# Patient Record
Sex: MALE | Race: BLACK OR AFRICAN AMERICAN | NOT HISPANIC OR LATINO | Employment: STUDENT | URBAN - METROPOLITAN AREA
[De-identification: names, ages, dates, MRNs, and addresses within clinical notes are randomized per-mention and may not be internally consistent; named-entity substitution may affect disease eponyms.]

---
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
## 2019-05-25 ENCOUNTER — HOSPITAL ENCOUNTER (EMERGENCY)
Facility: HOSPITAL | Age: 19
Discharge: HOME | End: 2019-05-25
Attending: EMERGENCY MEDICINE
Payer: MEDICAID

## 2019-05-25 VITALS
WEIGHT: 210 LBS | SYSTOLIC BLOOD PRESSURE: 127 MMHG | TEMPERATURE: 98 F | RESPIRATION RATE: 18 BRPM | HEIGHT: 68 IN | HEART RATE: 66 BPM | BODY MASS INDEX: 31.83 KG/M2 | DIASTOLIC BLOOD PRESSURE: 57 MMHG | OXYGEN SATURATION: 99 %

## 2019-05-25 DIAGNOSIS — Z20.2 STD EXPOSURE: Primary | ICD-10-CM

## 2019-05-25 PROCEDURE — 99284 EMERGENCY DEPT VISIT MOD MDM: CPT | Mod: 25

## 2019-05-25 PROCEDURE — 63600175 PHARM REV CODE 636 W HCPCS: Performed by: PHYSICIAN ASSISTANT

## 2019-05-25 PROCEDURE — 96372 THER/PROPH/DIAG INJ SC/IM: CPT

## 2019-05-25 PROCEDURE — 87491 CHLMYD TRACH DNA AMP PROBE: CPT

## 2019-05-25 PROCEDURE — 25000003 PHARM REV CODE 250: Performed by: PHYSICIAN ASSISTANT

## 2019-05-25 RX ORDER — METRONIDAZOLE 500 MG/1
2000 TABLET ORAL ONCE
Qty: 4 TABLET | Refills: 0 | Status: SHIPPED | OUTPATIENT
Start: 2019-05-25 | End: 2019-05-25

## 2019-05-25 RX ORDER — AZITHROMYCIN 250 MG/1
1000 TABLET, FILM COATED ORAL
Status: COMPLETED | OUTPATIENT
Start: 2019-05-25 | End: 2019-05-25

## 2019-05-25 RX ORDER — ONDANSETRON 4 MG/1
8 TABLET, ORALLY DISINTEGRATING ORAL ONCE
Qty: 2 TABLET | Refills: 0 | Status: SHIPPED | OUTPATIENT
Start: 2019-05-25 | End: 2019-05-25

## 2019-05-25 RX ORDER — CEFTRIAXONE 250 MG/1
250 INJECTION, POWDER, FOR SOLUTION INTRAMUSCULAR; INTRAVENOUS
Status: COMPLETED | OUTPATIENT
Start: 2019-05-25 | End: 2019-05-25

## 2019-05-25 RX ORDER — ONDANSETRON 4 MG/1
4 TABLET, ORALLY DISINTEGRATING ORAL
Status: COMPLETED | OUTPATIENT
Start: 2019-05-25 | End: 2019-05-25

## 2019-05-25 RX ADMIN — CEFTRIAXONE SODIUM 250 MG: 250 INJECTION, POWDER, FOR SOLUTION INTRAMUSCULAR; INTRAVENOUS at 06:05

## 2019-05-25 RX ADMIN — ONDANSETRON 4 MG: 4 TABLET, ORALLY DISINTEGRATING ORAL at 06:05

## 2019-05-25 RX ADMIN — AZITHROMYCIN MONOHYDRATE 1000 MG: 250 TABLET ORAL at 06:05

## 2019-05-25 NOTE — DISCHARGE INSTRUCTIONS
You were treated for possible Gonorrhea and Chlamydia today in ER.   You were given one time dose of Flagyl for trichomonas to take tomorrow with zofran to prevent nausea.     Follow up with STI clinic for full evaluation. You should abstain from intercourse for at least 1 week to allow infection to clear.     Return to ER for worsening symptoms or as needed.

## 2019-05-26 NOTE — ED PROVIDER NOTES
Encounter Date: 5/25/2019       History     Chief Complaint   Patient presents with    Exposure to STD     Pt reports he was possibly exposed to STD. Pt denies symptoms at this time.      CC: STD Exposure     HPI:   18-year-old male with no pertinent past medical history presenting with sexual partner for exposure to Trichomonas.  Patient denies any symptoms.  His partner was treated last week.  They have not had sexual intercourse since she was treated.  He denies fever, chills, nausea vomiting, abdominal pain, penile discharge, pain or swelling, testicular pain or scrotal swelling, dysuria, hematuria, urinary urgency or frequency. No attempted treatment.        Review of patient's allergies indicates:  No Known Allergies  History reviewed. No pertinent past medical history.  History reviewed. No pertinent surgical history.  History reviewed. No pertinent family history.  Social History     Tobacco Use    Smoking status: Never Smoker   Substance Use Topics    Alcohol use: No    Drug use: No     Review of Systems   Constitutional: Negative for chills and fever.   HENT: Negative for trouble swallowing.    Eyes: Negative for redness.   Respiratory: Negative for stridor.    Gastrointestinal: Negative for abdominal pain, nausea and vomiting.   Genitourinary: Negative for discharge, dysuria, frequency, hematuria, penile pain, penile swelling, scrotal swelling, testicular pain and urgency.   Musculoskeletal: Negative for back pain, gait problem and neck pain.   Skin: Negative for rash.   Neurological: Negative for speech difficulty.   Psychiatric/Behavioral: Negative for confusion.       Physical Exam     Initial Vitals [05/25/19 1831]   BP Pulse Resp Temp SpO2   (!) 127/57 66 18 98 °F (36.7 °C) 99 %      MAP       --         Physical Exam    Nursing note and vitals reviewed.  Constitutional: He appears well-developed and well-nourished. No distress.   HENT:   Head: Normocephalic.   Right Ear: External ear normal.    Left Ear: External ear normal.   Nose: Nose normal.   Mouth/Throat: Oropharynx is clear and moist.   Eyes: Conjunctivae are normal.   Cardiovascular: Normal rate and regular rhythm. Exam reveals no gallop and no friction rub.    No murmur heard.  Pulmonary/Chest: Breath sounds normal. No respiratory distress. He has no wheezes. He has no rhonchi. He has no rales.   Abdominal: Soft. Bowel sounds are normal. He exhibits no distension. There is no tenderness. There is no rebound and no guarding.   Musculoskeletal: Normal range of motion.   Neurological: He is alert.   Skin: Skin is warm and dry. No rash noted.   Psychiatric: He has a normal mood and affect.         ED Course   Procedures  Labs Reviewed   C. TRACHOMATIS/N. GONORRHOEAE BY AMP DNA          Imaging Results    None          Medical Decision Making:   Initial Assessment:   18-year-old male presenting for STD exposure to Trichomonas.  He denies any symptoms at this time.  Treated empirically for gonorrhea chlamydia with Rocephin and azithromycin in the ED.  Zofran given to prevent nausea.  Will discharge with 2 g of Flagyl and Zofran for Trichomonas treatment to take tomorrow to avoid nausea and vomiting due to large antibiotic dose.  Lab follow up with his primary care or STI clinic for full evaluation and management.  Return to the ER for worsening symptoms or as needed.                      Clinical Impression:       ICD-10-CM ICD-9-CM   1. STD exposure Z20.2 V01.6                                Nat Benson PA-C  05/25/19 1919

## 2019-05-27 LAB
C TRACH DNA SPEC QL NAA+PROBE: NOT DETECTED
N GONORRHOEA DNA SPEC QL NAA+PROBE: NOT DETECTED

## 2024-01-05 ENCOUNTER — HOSPITAL ENCOUNTER (EMERGENCY)
Facility: OTHER | Age: 24
Discharge: HOME | End: 2024-01-05
Attending: EMERGENCY MEDICINE
Payer: MEDICAID

## 2024-01-05 VITALS
HEART RATE: 73 BPM | WEIGHT: 192 LBS | DIASTOLIC BLOOD PRESSURE: 80 MMHG | SYSTOLIC BLOOD PRESSURE: 126 MMHG | OXYGEN SATURATION: 100 % | TEMPERATURE: 99 F | RESPIRATION RATE: 18 BRPM | BODY MASS INDEX: 27.49 KG/M2 | HEIGHT: 70 IN

## 2024-01-05 DIAGNOSIS — B35.3 TINEA PEDIS OF BOTH FEET: ICD-10-CM

## 2024-01-05 DIAGNOSIS — B35.6 TINEA CRURIS: ICD-10-CM

## 2024-01-05 DIAGNOSIS — Z20.2 POSSIBLE EXPOSURE TO STD: Primary | ICD-10-CM

## 2024-01-05 LAB
BILIRUB UR QL STRIP: NEGATIVE
CLARITY UR: CLEAR
COLOR UR: YELLOW
GLUCOSE UR QL STRIP: NEGATIVE
HGB UR QL STRIP: NEGATIVE
KETONES UR QL STRIP: NEGATIVE
LEUKOCYTE ESTERASE UR QL STRIP: NEGATIVE
NITRITE UR QL STRIP: NEGATIVE
PH UR STRIP: 6 [PH] (ref 5–8)
PROT UR QL STRIP: NEGATIVE
SP GR UR STRIP: 1.02 (ref 1–1.03)
URN SPEC COLLECT METH UR: NORMAL
UROBILINOGEN UR STRIP-ACNC: NEGATIVE EU/DL

## 2024-01-05 PROCEDURE — 81003 URINALYSIS AUTO W/O SCOPE: CPT | Performed by: EMERGENCY MEDICINE

## 2024-01-05 PROCEDURE — 99284 EMERGENCY DEPT VISIT MOD MDM: CPT

## 2024-01-05 PROCEDURE — 87491 CHLMYD TRACH DNA AMP PROBE: CPT | Performed by: EMERGENCY MEDICINE

## 2024-01-05 PROCEDURE — 96372 THER/PROPH/DIAG INJ SC/IM: CPT

## 2024-01-05 PROCEDURE — 63600175 PHARM REV CODE 636 W HCPCS

## 2024-01-05 RX ORDER — DOXYCYCLINE 100 MG/1
100 CAPSULE ORAL 2 TIMES DAILY
Qty: 14 CAPSULE | Refills: 0 | Status: SHIPPED | OUTPATIENT
Start: 2024-01-05 | End: 2024-01-12

## 2024-01-05 RX ORDER — CEFTRIAXONE 500 MG/1
500 INJECTION, POWDER, FOR SOLUTION INTRAMUSCULAR; INTRAVENOUS
Status: COMPLETED | OUTPATIENT
Start: 2024-01-05 | End: 2024-01-05

## 2024-01-05 RX ORDER — KETOCONAZOLE 20 MG/G
CREAM TOPICAL 2 TIMES DAILY
Qty: 30 G | Refills: 0 | Status: SHIPPED | OUTPATIENT
Start: 2024-01-05 | End: 2024-01-26

## 2024-01-05 RX ADMIN — CEFTRIAXONE SODIUM 500 MG: 500 INJECTION, POWDER, FOR SOLUTION INTRAMUSCULAR; INTRAVENOUS at 01:01

## 2024-01-05 NOTE — ED TRIAGE NOTES
Pt c/o itchy rash to groin and buttocks; states he thinks athlete foot transferred to groin. Denies urinary s/s. Endorses unprotected sex.

## 2024-01-05 NOTE — DISCHARGE INSTRUCTIONS
Please apply ketoconazole cream to your feet, groin and buttocks for fungal infection. Please keep the area clean and dry. Follow up with dermatology and podiatry. Referrals have been placed. Please start taking doxycycline if your chlamydia results come back positive. Refrain from sexual intercourse until your symptoms completely resolve. Notify sexual partners if your results are positive so they can get tested.

## 2024-01-06 NOTE — ED PROVIDER NOTES
"Encounter Date: 1/5/2024       History     Chief Complaint   Patient presents with    Exposure to STD     Pt believes he transferred athletes foot from his feet to his groin and buttocks. Denies penile discharge or dysuria. C/o "rawness" and itching to area. Admits to + unprotected sex with 1 partner. Thinks may now have a bacterial infection.     Marcelino Canchola is a 23 y.o. male with history of tinea pedis presenting to the emergency department for evaluation of rash to his feet, groin, and buttocks. He reports the rash has been present for months. He states that he was diagnosed with athlete's foot when he was in half-way.  He states that he never received treatment for this.  He states that he has been touching his foot in an occasionally puts his hands in his pants.  He thinks that he may have transfer the athlete's foot from his feet to his groin.  He has only been cleaning the area with soap and water and applying Vaseline to the area, which has not provided symptom relief.  He denies fever, chills, drainage.  He also reports mild dysuria.  States that he has been having unprotected sexual intercourse with a female partner.  He was concerned for STD and would like to be tested.  He denies abdominal pain, nausea, vomiting, diarrhea, constipation, hematuria, urinary frequency, urinary urgency, penile discharge, penile pain, penile swelling, scrotal swelling, or testicular pain.      The history is provided by the patient.     Review of patient's allergies indicates:  No Known Allergies  No past medical history on file.  No past surgical history on file.  No family history on file.  Social History     Tobacco Use    Smoking status: Never   Substance Use Topics    Alcohol use: No    Drug use: No     Review of Systems   Constitutional:  Negative for chills and fever.   HENT:  Negative for congestion, rhinorrhea and sore throat.    Respiratory:  Negative for cough and shortness of breath.    Cardiovascular:  Negative " for chest pain.   Gastrointestinal:  Negative for abdominal pain, diarrhea, nausea and vomiting.   Genitourinary:  Negative for dysuria, frequency and urgency.   Musculoskeletal:  Negative for back pain.   Skin:  Positive for rash (feet, buttocks and groin).   Neurological:  Negative for dizziness, numbness and headaches.   Psychiatric/Behavioral:  Negative for confusion.        Physical Exam     Initial Vitals [01/05/24 1150]   BP Pulse Resp Temp SpO2   137/84 76 18 98.7 °F (37.1 °C) 99 %      MAP       --         Physical Exam    Vitals reviewed.  Constitutional: He appears well-developed and well-nourished. No distress.   HENT:   Head: Normocephalic and atraumatic.   Nose: Nose normal.   Mouth/Throat: Oropharynx is clear and moist.   Eyes: Conjunctivae and EOM are normal.   Neck: Neck supple.   Normal range of motion.  Cardiovascular:  Normal rate, regular rhythm, normal heart sounds and intact distal pulses.           Pulmonary/Chest: Breath sounds normal. No respiratory distress. He has no wheezes. He has no rhonchi. He has no rales. He exhibits no tenderness.   Musculoskeletal:         General: No edema. Normal range of motion.      Cervical back: Normal range of motion and neck supple.     Neurological: He is alert and oriented to person, place, and time. He has normal strength.   Skin: Skin is warm and dry. Capillary refill takes less than 2 seconds.   Tinea pedis to bilateral feet. Tinea cruris to bilateral inguinal folds and buttocks.    Psychiatric: He has a normal mood and affect. His behavior is normal. Judgment and thought content normal.         ED Course   Procedures  Labs Reviewed   C. TRACHOMATIS/N. GONORRHOEAE BY AMP DNA   URINALYSIS, REFLEX TO URINE CULTURE    Narrative:     Specimen Source->Urine          Imaging Results    None          Medications   cefTRIAXone injection 500 mg (500 mg Intramuscular Given 1/5/24 4858)     Medical Decision Making  Risk  Prescription drug management.                           Medical Decision Making:   Initial Assessment:   Urgent evaluation of 23-year-old male who presents with rash to his groin and buttocks for months.  He also reports athlete's foot to bilateral feet.  Exam, he is well-appearing and nontoxic.  Hemodynamically stable.  Afebrile in the ED. tinea pedis to bilateral feet and toenails.  Tinea cruris to bilateral inguinal folds and bilateral buttocks.  Will prescribe topical medication.  Will check GC and UA.  ED Management:  On review of UA, no nitrites or leukocytes.  GC still pending.  Patient discharged home with prescriptions for ketoconazole and course of doxycycline.  He was treated in the ED for gonorrhea.  Patient advised to refrain from sexual intercourse until his results are back.  Recommended informing his sexual partners if his test results are positive.  Patient counseled on safe sex practices.  Ambulatory referral to Dermatology and Podiatry were also provided.  Patient verbalized understanding and agreement with this plan of care. He was given specific return precautions. Advised to follow up with PCP as needed. All questions and concerns addressed. He is stable for discharge.     This note was created with MModal Fluency Direct Dictation. Please excuse any spelling or grammatical errors.             Clinical Impression:  Final diagnoses:  [Z20.2] Possible exposure to STD (Primary)  [B35.3] Tinea pedis of both feet  [B35.6] Tinea cruris          ED Disposition Condition    Discharge Stable          ED Prescriptions       Medication Sig Dispense Start Date End Date Auth. Provider    ketoconazole (NIZORAL) 2 % cream Apply topically 2 (two) times daily. for 21 days 30 g 1/5/2024 1/26/2024 Duglas Anaya PA-C    doxycycline (VIBRAMYCIN) 100 MG Cap Take 1 capsule (100 mg total) by mouth 2 (two) times daily. for 7 days 14 capsule 1/5/2024 1/12/2024 Duglas Anaya PA-C          Follow-up Information    None          Dugals Anaya PA-C  01/06/24 5746

## 2024-01-09 ENCOUNTER — NURSE TRIAGE (OUTPATIENT)
Dept: ADMINISTRATIVE | Facility: CLINIC | Age: 24
End: 2024-01-09
Payer: MEDICAID

## 2024-01-09 LAB
C TRACH DNA SPEC QL NAA+PROBE: NOT DETECTED
N GONORRHOEA DNA SPEC QL NAA+PROBE: NOT DETECTED

## 2024-01-09 NOTE — TELEPHONE ENCOUNTER
Pt escalated to [facility] queue.  1st attempt; NA; LVM on unidentified VM; # verified.  2nd attempt; NA; LVM on unidentified VM; # verified.  No contact call.  Unable to route d/t no PCP.    Reason for Disposition   Message left on unidentified voice mail. Phone number verified.    Additional Information   Negative: Caller has already spoken with the PCP (or office), and has no further questions   Negative: Caller has already spoken with another triager and has no further questions   Negative: Caller has already spoken with another triager or PCP (or office), and has further questions and triager able to answer questions.   Negative: Busy signal.  First attempt to contact caller.  Follow-up call scheduled within 15 minutes.   Negative: No answer.  First attempt to contact caller.  Follow-up call scheduled within 15 minutes.   Negative: Message left on identified voicemail    Protocols used: No Contact or Duplicate Contact Call-A-[place]